# Patient Record
Sex: FEMALE | URBAN - METROPOLITAN AREA
[De-identification: names, ages, dates, MRNs, and addresses within clinical notes are randomized per-mention and may not be internally consistent; named-entity substitution may affect disease eponyms.]

---

## 2018-01-19 ENCOUNTER — HISTORICAL (OUTPATIENT)
Dept: ADMINISTRATIVE | Facility: HOSPITAL | Age: 23
End: 2018-01-19

## 2018-02-21 ENCOUNTER — HOSPITAL ENCOUNTER (OUTPATIENT)
Dept: OBSTETRICS AND GYNECOLOGY | Facility: HOSPITAL | Age: 23
End: 2018-02-22
Attending: OBSTETRICS & GYNECOLOGY | Admitting: OBSTETRICS & GYNECOLOGY

## 2018-04-13 ENCOUNTER — HISTORICAL (OUTPATIENT)
Dept: ADMINISTRATIVE | Facility: HOSPITAL | Age: 23
End: 2018-04-13

## 2018-06-06 ENCOUNTER — HISTORICAL (OUTPATIENT)
Dept: LAB | Facility: HOSPITAL | Age: 23
End: 2018-06-06

## 2022-04-29 NOTE — ED PROVIDER NOTES
Patient:   Rhianna Gil            MRN: [id number]            FIN: [id number]               Age:   22 years     Sex:  Female     :  1995   Associated Diagnoses:   Hemorrhoid   Author:   Joanne Foster      Basic Information   Time seen: Date & time 2018 21:54:00.   History source: Patient.   Arrival mode: Private vehicle.   History limitation: None.   Additional information: Chief Complaint from Nursing Triage Note : Chief Complaint   2018 21:46 CST      Chief Complaint           Pt to the ER with complaints of constipation and hemorrhoids.  Pt states pain  increasing.  22 weeks pregnant LBM yesterday not effective.  .      History of Present Illness   The patient presents with 23 y/o female who presents with constipation x 1 week and hemorrhoids. she is approx 22 weeks gestation. shae parrish.        Health Status   Allergies:    Allergic Reactions (Selected)  No Known Allergies,    Allergies (1) Active Reaction  No Known Allergies None Documented  .      Past Medical/ Family/ Social History   Medical history:    Resolved  Pregnant ([id number]): Onset on 2015 at 20 years.  Resolved on 2016 at 21 years..   Surgical history:    No active procedure history items have been selected or recorded..   Family history:    Aplastic anemia  Brother  Skin cancer  Father  Bipolar  Sister  Epilepsia.  Mother  .   Problem list:    No qualifying data available  .      Physical Examination               Vital Signs   Vital Signs   2018 21:46 CST      Temperature Oral          36.6 DegC                             Temperature Oral (calculated)             97.88 DegF                             Peripheral Pulse Rate     97 bpm                             Respiratory Rate          18 br/min                             SpO2                      100 %                             Oxygen Therapy            Room air                             Systolic Blood Pressure   129 mmHg                              Diastolic Blood Pressure  76 mmHg  .      Vital Signs (last 24 hrs)_____  Last Charted___________  Temp Oral     36.6 DegC  (FEB 21 21:46)  Heart Rate Peripheral   97 bpm  (FEB 21 21:46)  Resp Rate         18 br/min  (FEB 21 21:46)  SBP      129 mmHg  (FEB 21 21:46)  DBP      76 mmHg  (FEB 21 21:46)  SpO2      100 %  (FEB 21 21:46)  .   Measurements   2/21/2018 21:46 CST      Weight Dosing             64.5 kg                             Weight Measured and Calculated in Lbs     142.20 lb                             Height/Length Dosing      162.56 cm  .   Basic Oxygen Information   2/21/2018 21:46 CST      SpO2                      100 %                             Oxygen Therapy            Room air  .      Medical Decision Making   Results review:     No qualifying data available.      Reexamination/ Reevaluation   Vital signs   Basic Oxygen Information   2/21/2018 21:46 CST      SpO2                      100 %                             Oxygen Therapy            Room air        Impression and Plan   Diagnosis   Hemorrhoid ([id number] K64.9)   Plan   Disposition: Time 2/21/2018 23:30:00, Eloped, saw in triage only, no acute distress. no abdominal pain, no vaginal bleeding. .

## 2024-06-26 ENCOUNTER — LAB VISIT (OUTPATIENT)
Dept: LAB | Facility: HOSPITAL | Age: 29
End: 2024-06-26
Attending: NURSE PRACTITIONER
Payer: MEDICAID

## 2024-06-26 DIAGNOSIS — F31.4 SEVERE DEPRESSED BIPOLAR I DISORDER WITHOUT PSYCHOTIC FEATURES: Primary | ICD-10-CM

## 2024-06-26 DIAGNOSIS — F41.9 ANXIETY DISORDER OF CHILDHOOD OR ADOLESCENCE: ICD-10-CM

## 2024-06-26 LAB
OHS QRS DURATION: 78 MS
OHS QTC CALCULATION: 455 MS

## 2024-06-26 PROCEDURE — 93010 ELECTROCARDIOGRAM REPORT: CPT | Mod: ,,, | Performed by: INTERNAL MEDICINE

## 2024-06-26 PROCEDURE — 93005 ELECTROCARDIOGRAM TRACING: CPT
